# Patient Record
Sex: MALE | Race: BLACK OR AFRICAN AMERICAN | NOT HISPANIC OR LATINO | Employment: FULL TIME | URBAN - METROPOLITAN AREA
[De-identification: names, ages, dates, MRNs, and addresses within clinical notes are randomized per-mention and may not be internally consistent; named-entity substitution may affect disease eponyms.]

---
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
## 2022-04-26 ENCOUNTER — HOSPITAL ENCOUNTER (EMERGENCY)
Facility: CLINIC | Age: 22
Discharge: STILL ADMITTED OR ADMINISTRATIVE | End: 2022-04-26
Payer: MEDICAID

## 2022-04-27 ENCOUNTER — OFFICE VISIT (OUTPATIENT)
Dept: BEHAVIORAL HEALTH | Facility: CLINIC | Age: 22
End: 2022-04-27
Payer: COMMERCIAL

## 2022-04-27 ENCOUNTER — LAB (OUTPATIENT)
Dept: LAB | Facility: CLINIC | Age: 22
End: 2022-04-27
Payer: COMMERCIAL

## 2022-04-27 VITALS
SYSTOLIC BLOOD PRESSURE: 132 MMHG | HEART RATE: 98 BPM | BODY MASS INDEX: 25.61 KG/M2 | HEIGHT: 68 IN | WEIGHT: 169 LBS | DIASTOLIC BLOOD PRESSURE: 81 MMHG

## 2022-04-27 DIAGNOSIS — F11.20 OPIOID USE DISORDER, SEVERE, DEPENDENCE (H): ICD-10-CM

## 2022-04-27 DIAGNOSIS — Z11.3 SCREEN FOR STD (SEXUALLY TRANSMITTED DISEASE): ICD-10-CM

## 2022-04-27 DIAGNOSIS — F11.20 OPIOID USE DISORDER, SEVERE, DEPENDENCE (H): Primary | ICD-10-CM

## 2022-04-27 DIAGNOSIS — F11.23 OPIOID DEPENDENCE WITH WITHDRAWAL (H): ICD-10-CM

## 2022-04-27 LAB
ALBUMIN SERPL-MCNC: 3.7 G/DL (ref 3.4–5)
ALP SERPL-CCNC: 89 U/L (ref 40–150)
ALT SERPL W P-5'-P-CCNC: 55 U/L (ref 0–70)
AST SERPL W P-5'-P-CCNC: 24 U/L (ref 0–45)
BILIRUB DIRECT SERPL-MCNC: 0.2 MG/DL (ref 0–0.2)
BILIRUB SERPL-MCNC: 1.2 MG/DL (ref 0.2–1.3)
FENTANYL UR QL: ABNORMAL
PROT SERPL-MCNC: 7.8 G/DL (ref 6.8–8.8)

## 2022-04-27 PROCEDURE — 87591 N.GONORRHOEAE DNA AMP PROB: CPT | Performed by: NURSE PRACTITIONER

## 2022-04-27 PROCEDURE — 86803 HEPATITIS C AB TEST: CPT

## 2022-04-27 PROCEDURE — 80076 HEPATIC FUNCTION PANEL: CPT

## 2022-04-27 PROCEDURE — 36415 COLL VENOUS BLD VENIPUNCTURE: CPT

## 2022-04-27 PROCEDURE — 86780 TREPONEMA PALLIDUM: CPT

## 2022-04-27 PROCEDURE — 99205 OFFICE O/P NEW HI 60 MIN: CPT | Performed by: NURSE PRACTITIONER

## 2022-04-27 PROCEDURE — 80307 DRUG TEST PRSMV CHEM ANLYZR: CPT | Performed by: NURSE PRACTITIONER

## 2022-04-27 PROCEDURE — 87389 HIV-1 AG W/HIV-1&-2 AB AG IA: CPT

## 2022-04-27 PROCEDURE — 87491 CHLMYD TRACH DNA AMP PROBE: CPT | Performed by: NURSE PRACTITIONER

## 2022-04-27 RX ORDER — ONDANSETRON 4 MG/1
4 TABLET, ORALLY DISINTEGRATING ORAL EVERY 8 HOURS PRN
Qty: 12 TABLET | Refills: 0 | Status: SHIPPED | OUTPATIENT
Start: 2022-04-27

## 2022-04-27 RX ORDER — HYDROXYZINE PAMOATE 25 MG/1
CAPSULE ORAL
COMMUNITY
Start: 2022-04-20

## 2022-04-27 RX ORDER — GABAPENTIN 300 MG/1
300 CAPSULE ORAL 3 TIMES DAILY PRN
Qty: 21 CAPSULE | Refills: 0 | Status: SHIPPED | OUTPATIENT
Start: 2022-04-27 | End: 2022-05-04

## 2022-04-27 RX ORDER — BUPRENORPHINE HYDROCHLORIDE, NALOXONE HYDROCHLORIDE 8; 2 MG/1; MG/1
1 FILM, SOLUBLE BUCCAL; SUBLINGUAL 2 TIMES DAILY
Qty: 14 FILM | Refills: 0 | Status: SHIPPED | OUTPATIENT
Start: 2022-04-27 | End: 2022-05-04

## 2022-04-27 RX ORDER — CLONIDINE HYDROCHLORIDE 0.1 MG/1
0.1 TABLET ORAL 3 TIMES DAILY PRN
Qty: 21 TABLET | Refills: 0 | Status: SHIPPED | OUTPATIENT
Start: 2022-04-27 | End: 2022-05-04

## 2022-04-27 RX ORDER — QUETIAPINE FUMARATE 50 MG/1
TABLET, FILM COATED ORAL
COMMUNITY
Start: 2022-04-20 | End: 2022-04-27

## 2022-04-27 RX ORDER — TRAZODONE HYDROCHLORIDE 50 MG/1
50 TABLET, FILM COATED ORAL AT BEDTIME
Qty: 30 TABLET | Refills: 0 | Status: SHIPPED | OUTPATIENT
Start: 2022-04-27

## 2022-04-27 NOTE — PROGRESS NOTES
[facility] Initial Visit    ASSESSMENT/PLAN                                                      1. Opioid use disorder, severe, dependence (H)  - Discussed mechanism of action, potential risks/benefits/side effects of Suboxone. Discussed risk of precipitated withdrawal with initiation of buprenorphine in the presence of full opioid agonists. Has been greater than 24 hours since last fentanyl use, recommended starting with 1/2 film and titration of dose up to 16 mg per day.   - Prescription sent for narcan with refills for harm reduction   - Encouraged treatment at [facility] and ongoing recovery supports.   - SUBOXONE 8-2 MG per film; Place 1 Film under the tongue 2 times daily for 7 days  Dispense: 14 Film; Refill: 0  - Hepatic panel (Albumin, ALT, AST, Bili, Alk Phos, TP); Future    2. Opioid dependence with withdrawal (H)  - gabapentin (NEURONTIN) 300 MG capsule; Take 1 capsule (300 mg) by mouth 3 times daily as needed for other (withdrawal symptoms including anxiety, restlessness, sleeplessness)  Dispense: 21 capsule; Refill: 0  - cloNIDine (CATAPRES) 0.1 MG tablet; Take 1 tablet (0.1 mg) by mouth 3 times daily as needed (opioid withdrawal, anxiety, restlessness, irritability, hot/cold flashes)  Dispense: 21 tablet; Refill: 0  - traZODone (DESYREL) 50 MG tablet; Take 1 tablet (50 mg) by mouth At Bedtime  Dispense: 30 tablet; Refill: 0  - ondansetron (ZOFRAN-ODT) 4 MG ODT tab; Take 1 tablet (4 mg) by mouth every 8 hours as needed for nausea  Dispense: 12 tablet; Refill: 0    3. Screen for STD (sexually transmitted disease)  - Pt requesting STD screening today. Not symptomatic. HSV 1 and/or 2 IgG 22.3 on 11/19/21  - HIV Antigen Antibody Combo; Future  - Hepatitis C Screen Reflex to HCV RNA Quant and Genotype; Future  - Treponema Abs w Reflex to RPR and Titer; Future  - NEISSERIA GONORRHOEA PCR  - CHLAMYDIA TRACHOMATIS PCR      Patient counseling completed today:  Recommend pt keep naloxone in  "their possession and reviewed other aspects of harm reduction to reduce risk of overdose with return to use. Recommended avoiding concurrent use of alcohol, benzodiazepines or other sedatives with buprenorphine or other opioids.  Discussed importance of avoiding isolation, building a network of supportive relationships, avoiding people/places/things associated with past use to reduce risk of relapse; including motivational interviewing regarding psychosocial treatment for addiction.      Return in about 1 week (around 5/4/2022) for Follow up, with me, in person.    SUBJECTIVE                                                      CC/HPI:  Madi Zapata is a 21 year old male with PMH of opioid use disorder who presents to the [facility] for initial visit.  Pt was referred by PRS at [facility].  Pt decided to seek treatment in Recovery Clinic to establish with suboxone provider.     Reports he was in treatment at [facility]. For 3 and 1/2 weeks. He was kicked out for substance use and \"not reporting a friend who was using at the facility\" He is now in treatment at [facility]. He was brought here by a PRS there. He was taking Suboxone 8 mg BID. He has an active prescription but reports he is not sure where the rest of his medications are. He has been using fentanyl pills for the past 6 days. No history of over dose. Typically uses 10 -15 pills per day. However with recent relapse he has been using about 5 pills. He also used meth on Saturday. He does not typically use this, maybe once in the past 6 month. Used it to combat adverse side effects of opioids with withdrawal.  Reports his last use of fentanyl was over 24 hours ago. This is his third time in treatment. Longest sober period was 3 months. He is on probation and court ordered to be in treatment, he is not sure if being at [facility] now will fulfill that obligation since he was discharged from [facility]. Would like STD screening today. Sexually active. Not symptomatic. " "Only slept for 2 hours last night, very drowsy today. When asked directly pt denies any substance use in the past 24 hours. Denies benzodiazepine or alcohol use.       Substance Use History :  Opioids:   Age at first use: 21 yo, tried from a friend. \"only escalated from there\"   Current use: timing of last use: 4/26/22 1400; substance: \"perc 30\"; quantity 10-15 pills daily; route: oral      IV drug use: No   History of overdose: No  Previous treatments : Yes  Longest period of sobriety: 3 months  Medical complications related to substance use: denies  Hepatitis C: nonreactive 11/19/21  HIV:nonreactive 11/19/21    Taking buprenorphine? No, ran out last week  Narcan currently available: No    DSM-5 OUD criteria met:  Taken in larger amounts/greater time spent in behavior over longer period of time than intended,Yes   Persistent desire or unsuccessful efforts to cut down or control use/behavior, Yes   A great deal of time is spent in activities necessary to obtain the substance/participate in the behavior or recover from its effects, Yes   Cravings, Yes   Recurrent use/behavior resulting in failure to fulfill major role obligations at work, school, or home, Yes   Continued use/behavior despite having persistent or recurrent social or interpersonal problems caused or exacerbated by effects of use/behavior, Yes   Important social, occupational, or recreational activities are given up or reduced because of use/behavior, Yes   Recurrent use/behavior in situations in which it is physically hazardous, No   Continued use/behavior despite knowledge of having a persistent or recurrent physical or psychological problem that is likely to have been caused or exacerbated by use/behavior, Yes   Tolerance, Yes    Withdrawal, Yes     Other Addiction History:  Stimulants:   Past use: denies  Use in last 6 months: Meth one time  Sedatives/hypnotics/anxiolytics:   Past use: denies  Use in last 6 months: denies  Alcohol:   Past use: " denies  Use in last 6 months: denies  Nicotine:   Cigarettes: 2-3 daily  Vaping: denies  Chewing tobacco: denies  Cannabis:   Past use: started at age 15  Use in last 6 months: daily use  Hallucinogens:   Past use: denies  Use in last 6 months: denies  Behavioral addictions:   denies    [place] Prescription Drug Monitoring Program Reviewed:  Yes    Past Medical History:   Diagnosis Date     Anxiety      PAST PSYCHIATRIC HISTORY:  Diagnoses- PTSD, depression   Suicide Attempts: No   Hospitalizations: No     PHQ-2 Score:     PHQ-2 ( 1999 [employer]) 4/27/2022   Q1: Little interest or pleasure in doing things 3   Q2: Feeling down, depressed or hopeless 3   PHQ-2 Score 6        If PHQ-2 score of 3 or higher, has [facility] therapist or provider been notified? Yes    Any current suicidal ideation? No  If yes, has [facility] therapist or provider been notified? N/A    Mental health provider: caroline (follow up on  referral if needed)    No past surgical history on file.    Medications:  hydrOXYzine (VISTARIL) 25 MG capsule,   ondansetron (ZOFRAN-ODT) 4 MG ODT tab, Take 1 tablet (4 mg) by mouth every 8 hours as needed for nausea  QUEtiapine (SEROQUEL) 50 MG tablet,     No current facility-administered medications on file prior to visit.      No Known Allergies    No family history on file.      Social History  Housing status: in a sober house  Employment status: Unemployed, not seeking work  Relationship status: Partnered  Children: 2 children  Legal: on probation   Insurance needs: active  Contact information up to date? yes    3rd Party Involvement none today (please obtain BENITO if pt would like to include)    REVIEW OF SYSTEMS:  General: Withdrawal symptoms as described below.  No recent fevers.   Eyes:  No vision concerns.  No jaundice.    Resp: No coughing, wheezing or shortness of breath  CV: No chest pains or palpitations  GI: No complaints other than as above  : No urinary frequency or dysuria, no  "discharge  Musculoskeletal: No significant muscle or joint pains other than as above.  No edema  Neurologic: No numbness, tingling, weakness, problems with balance or coordination  Psychiatric: No acute concerns other than as above.   Skin: No rashes or areas of acute infection    OBJECTIVE                                                      Clinical Opioid Withdrawal Scale (COWS)  Resting Pulse Rate  1  =     Sweating    (over past 1/2 hour) 1  =  subjective report of chills or flushing   Restlessness  0  =  able to sit still   Pupil size  0  =  pupils pinned or normal size for room light   Bone or Joint Aches    (acute only) 1  =  mild diffuse discomfort   Runny nose or tearing    (unrelated to cold/allergies) 0  =  not present   GI Upset    (over past 1/2 hour) 2  =  nausea or loose stool   Tremor    (outstretched hands) 0  =  no tremor   Yawning    (during assessment) 0  =  no yawning   Anxiety/Irritability 0  =  none   Gooseflesh skin 0  =  skin is smooth     TOTAL SCORE  Add column for score   4       /81   Pulse 98   Ht 1.727 m (5' 8\")   Wt 76.7 kg (169 lb)   BMI 25.70 kg/m      Physical Exam  Constitutional:       General: He is awake. He is not in acute distress.     Appearance: Normal appearance. He is not ill-appearing or diaphoretic.   HENT:      Head: Normocephalic and atraumatic.      Nose: No rhinorrhea.   Eyes:      General: No scleral icterus.     Extraocular Movements: Extraocular movements intact.   Cardiovascular:      Rate and Rhythm: Normal rate.   Pulmonary:      Effort: Pulmonary effort is normal.   Neurological:      General: No focal deficit present.      Mental Status: He is oriented to person, place, and time and easily aroused. He is lethargic.      Motor: No tremor.      Coordination: Coordination normal.      Gait: Gait normal.   Psychiatric:         Attention and Perception: Perception normal. He is inattentive.         Mood and Affect: Mood normal. Mood is not " anxious or depressed. Affect is not flat.         Speech: Speech normal. Speech is not rapid and pressured, delayed or slurred.         Behavior: Behavior is not agitated, withdrawn or hyperactive. Behavior is cooperative.         Thought Content: Thought content normal. Thought content does not include suicidal ideation.         Cognition and Memory: Cognition and memory normal.      Comments: Insight and judgment if fair          Labs:    UDS: positive for methamphetamines  *POC urine drug screen does not screen for Fentanyl    Recent Results (from the past 720 hour(s))   Hepatic panel (Albumin, ALT, AST, Bili, Alk Phos, TP)    Collection Time: 04/27/22  2:22 PM   Result Value Ref Range    Bilirubin Total 1.2 0.2 - 1.3 mg/dL    Bilirubin Direct 0.2 0.0 - 0.2 mg/dL    Protein Total 7.8 6.8 - 8.8 g/dL    Albumin 3.7 3.4 - 5.0 g/dL    Alkaline Phosphatase 89 40 - 150 U/L    AST 24 0 - 45 U/L    ALT 55 0 - 70 U/L       Total time 60 min spent in review of medical record,  review, obtaining histories, reviewing symptoms, discussing pt's goals for treatment, counseling noted above.    NEGRITO Hernandez CNP on 4/27/2022 at 3:41 PM  [place] [id number] [place] [id number] [phone]

## 2022-04-28 LAB
C TRACH DNA SPEC QL NAA+PROBE: NEGATIVE
HCV AB SERPL QL IA: NONREACTIVE
HIV 1+2 AB+HIV1 P24 AG SERPL QL IA: NONREACTIVE
N GONORRHOEA DNA SPEC QL NAA+PROBE: NEGATIVE
T PALLIDUM AB SER QL: NONREACTIVE

## 2022-05-04 ENCOUNTER — TELEPHONE (OUTPATIENT)
Dept: BEHAVIORAL HEALTH | Facility: CLINIC | Age: 22
End: 2022-05-04
Payer: COMMERCIAL

## 2022-08-06 ENCOUNTER — HOSPITAL ENCOUNTER (EMERGENCY)
Facility: CLINIC | Age: 22
Discharge: HOME | End: 2022-08-06
Payer: COMMERCIAL

## 2022-08-06 VITALS
DIASTOLIC BLOOD PRESSURE: 53 MMHG | SYSTOLIC BLOOD PRESSURE: 126 MMHG | RESPIRATION RATE: 16 BRPM | TEMPERATURE: 98.7 F | BODY MASS INDEX: 25.94 KG/M2 | WEIGHT: 171.2 LBS | HEART RATE: 100 BPM | OXYGEN SATURATION: 97 % | HEIGHT: 68 IN

## 2024-02-22 ENCOUNTER — HOSPITAL ENCOUNTER (EMERGENCY)
Facility: CLINIC | Age: 24
Discharge: HOME | End: 2024-02-23
Attending: EMERGENCY MEDICINE | Admitting: EMERGENCY MEDICINE
Payer: COMMERCIAL

## 2024-02-22 ENCOUNTER — APPOINTMENT (OUTPATIENT)
Dept: CT IMAGING | Facility: CLINIC | Age: 24
End: 2024-02-22
Attending: EMERGENCY MEDICINE
Payer: COMMERCIAL

## 2024-02-22 ENCOUNTER — APPOINTMENT (OUTPATIENT)
Dept: GENERAL RADIOLOGY | Facility: CLINIC | Age: 24
End: 2024-02-22
Attending: EMERGENCY MEDICINE
Payer: COMMERCIAL

## 2024-02-22 DIAGNOSIS — R04.2 HEMOPTYSIS: ICD-10-CM

## 2024-02-22 DIAGNOSIS — J06.9 VIRAL URI: ICD-10-CM

## 2024-02-22 DIAGNOSIS — J20.9 ACUTE BRONCHITIS, UNSPECIFIED ORGANISM: ICD-10-CM

## 2024-02-22 LAB
ANION GAP SERPL CALCULATED.3IONS-SCNC: 11 MMOL/L (ref 7–15)
BASOPHILS # BLD AUTO: 0.1 10E3/UL (ref 0–0.2)
BASOPHILS NFR BLD AUTO: 1 %
BUN SERPL-MCNC: 23.9 MG/DL (ref 6–20)
CALCIUM SERPL-MCNC: 9.2 MG/DL (ref 8.6–10)
CHLORIDE SERPL-SCNC: 102 MMOL/L (ref 98–107)
CREAT SERPL-MCNC: 0.87 MG/DL (ref 0.67–1.17)
D DIMER PPP FEU-MCNC: 0.58 UG/ML FEU (ref 0–0.5)
DEPRECATED HCO3 PLAS-SCNC: 24 MMOL/L (ref 22–29)
EGFRCR SERPLBLD CKD-EPI 2021: >90 ML/MIN/1.73M2
EOSINOPHIL # BLD AUTO: 0.7 10E3/UL (ref 0–0.7)
EOSINOPHIL NFR BLD AUTO: 6 %
ERYTHROCYTE [DISTWIDTH] IN BLOOD BY AUTOMATED COUNT: 11.6 % (ref 10–15)
FLUAV RNA SPEC QL NAA+PROBE: NEGATIVE
FLUBV RNA RESP QL NAA+PROBE: NEGATIVE
GLUCOSE SERPL-MCNC: 109 MG/DL (ref 70–99)
HCT VFR BLD AUTO: 46.6 % (ref 40–53)
HGB BLD-MCNC: 16.2 G/DL (ref 13.3–17.7)
IMM GRANULOCYTES # BLD: 0.4 10E3/UL
IMM GRANULOCYTES NFR BLD: 3 %
INR PPP: 1 (ref 0.85–1.15)
LYMPHOCYTES # BLD AUTO: 3.1 10E3/UL (ref 0.8–5.3)
LYMPHOCYTES NFR BLD AUTO: 24 %
MCH RBC QN AUTO: 29.6 PG (ref 26.5–33)
MCHC RBC AUTO-ENTMCNC: 34.8 G/DL (ref 31.5–36.5)
MCV RBC AUTO: 85 FL (ref 78–100)
MONOCYTES # BLD AUTO: 0.8 10E3/UL (ref 0–1.3)
MONOCYTES NFR BLD AUTO: 6 %
NEUTROPHILS # BLD AUTO: 7.8 10E3/UL (ref 1.6–8.3)
NEUTROPHILS NFR BLD AUTO: 60 %
NRBC # BLD AUTO: 0 10E3/UL
NRBC BLD AUTO-RTO: 0 /100
PLATELET # BLD AUTO: 289 10E3/UL (ref 150–450)
POTASSIUM SERPL-SCNC: 4 MMOL/L (ref 3.4–5.3)
RBC # BLD AUTO: 5.47 10E6/UL (ref 4.4–5.9)
RSV RNA SPEC NAA+PROBE: NEGATIVE
SARS-COV-2 RNA RESP QL NAA+PROBE: NEGATIVE
SODIUM SERPL-SCNC: 137 MMOL/L (ref 135–145)
WBC # BLD AUTO: 12.9 10E3/UL (ref 4–11)

## 2024-02-22 PROCEDURE — 96360 HYDRATION IV INFUSION INIT: CPT | Mod: 59 | Performed by: EMERGENCY MEDICINE

## 2024-02-22 PROCEDURE — 71275 CT ANGIOGRAPHY CHEST: CPT

## 2024-02-22 PROCEDURE — 36415 COLL VENOUS BLD VENIPUNCTURE: CPT | Performed by: EMERGENCY MEDICINE

## 2024-02-22 PROCEDURE — 71046 X-RAY EXAM CHEST 2 VIEWS: CPT

## 2024-02-22 PROCEDURE — 258N000003 HC RX IP 258 OP 636: Performed by: EMERGENCY MEDICINE

## 2024-02-22 PROCEDURE — 87637 SARSCOV2&INF A&B&RSV AMP PRB: CPT | Performed by: EMERGENCY MEDICINE

## 2024-02-22 PROCEDURE — 85379 FIBRIN DEGRADATION QUANT: CPT | Performed by: EMERGENCY MEDICINE

## 2024-02-22 PROCEDURE — 250N000009 HC RX 250: Performed by: EMERGENCY MEDICINE

## 2024-02-22 PROCEDURE — 99285 EMERGENCY DEPT VISIT HI MDM: CPT | Mod: 25 | Performed by: EMERGENCY MEDICINE

## 2024-02-22 PROCEDURE — 99284 EMERGENCY DEPT VISIT MOD MDM: CPT | Performed by: EMERGENCY MEDICINE

## 2024-02-22 PROCEDURE — 80048 BASIC METABOLIC PNL TOTAL CA: CPT | Performed by: EMERGENCY MEDICINE

## 2024-02-22 PROCEDURE — 85025 COMPLETE CBC W/AUTO DIFF WBC: CPT | Performed by: EMERGENCY MEDICINE

## 2024-02-22 PROCEDURE — 85610 PROTHROMBIN TIME: CPT | Performed by: EMERGENCY MEDICINE

## 2024-02-22 PROCEDURE — 250N000011 HC RX IP 250 OP 636: Performed by: EMERGENCY MEDICINE

## 2024-02-22 RX ORDER — IOPAMIDOL 755 MG/ML
100 INJECTION, SOLUTION INTRAVASCULAR ONCE
Status: COMPLETED | OUTPATIENT
Start: 2024-02-22 | End: 2024-02-22

## 2024-02-22 RX ADMIN — IOPAMIDOL 64 ML: 755 INJECTION, SOLUTION INTRAVENOUS at 23:55

## 2024-02-22 RX ADMIN — SODIUM CHLORIDE 1000 ML: 9 INJECTION, SOLUTION INTRAVENOUS at 23:03

## 2024-02-22 RX ADMIN — SODIUM CHLORIDE 84 ML: 9 INJECTION, SOLUTION INTRAVENOUS at 23:55

## 2024-02-22 ASSESSMENT — ACTIVITIES OF DAILY LIVING (ADL)
ADLS_ACUITY_SCORE: 35
ADLS_ACUITY_SCORE: 35

## 2024-02-22 ASSESSMENT — COLUMBIA-SUICIDE SEVERITY RATING SCALE - C-SSRS
6. HAVE YOU EVER DONE ANYTHING, STARTED TO DO ANYTHING, OR PREPARED TO DO ANYTHING TO END YOUR LIFE?: NO
2. HAVE YOU ACTUALLY HAD ANY THOUGHTS OF KILLING YOURSELF IN THE PAST MONTH?: NO
1. IN THE PAST MONTH, HAVE YOU WISHED YOU WERE DEAD OR WISHED YOU COULD GO TO SLEEP AND NOT WAKE UP?: NO

## 2024-02-23 VITALS
RESPIRATION RATE: 18 BRPM | TEMPERATURE: 98 F | SYSTOLIC BLOOD PRESSURE: 130 MMHG | DIASTOLIC BLOOD PRESSURE: 67 MMHG | HEIGHT: 68 IN | OXYGEN SATURATION: 97 % | HEART RATE: 75 BPM | BODY MASS INDEX: 30.31 KG/M2 | WEIGHT: 200 LBS

## 2024-02-23 RX ORDER — BENZONATATE 200 MG/1
200 CAPSULE ORAL 3 TIMES DAILY PRN
Qty: 10 CAPSULE | Refills: 0 | Status: SHIPPED | OUTPATIENT
Start: 2024-02-23

## 2024-02-23 NOTE — ED TRIAGE NOTES
Triage Assessment (Adult)       Row Name 02/22/24 7021          Triage Assessment    Airway WDL WDL        Respiratory WDL    Respiratory WDL WDL        Skin Circulation/Temperature WDL    Skin Circulation/Temperature WDL WDL        Cardiac WDL    Cardiac WDL WDL        Peripheral/Neurovascular WDL    Peripheral Neurovascular WDL WDL

## 2024-02-23 NOTE — ED PROVIDER NOTES
"ED Provider Note  [facility]      History     Chief Complaint   Patient presents with    Hemoptysis     HPI  Madi Zapata is a 23 year old male who presents to the emergency room for evaluation of hemoptysis.  Patient states he has been ill with a cough has been productive for the past 4 to 5 days.  The cough was originally productive of green sputum.  Today, the patient has had blood-streaked sputum.  He complains of some chest pain and headache when he coughs but otherwise denies any constant or ongoing pain.  He denies any dyspnea.  No fever.  No recent ill exposures.  No recent travel.  No leg pain or swelling.  Patient denies any other bruising or bleeding.  No epistaxis, hematuria, or gingival bleeding.  He denies any anticoagulant use.  No medication use.    Past Medical History  Past Medical History:   Diagnosis Date    Anxiety      History reviewed. No pertinent surgical history.  benzonatate (TESSALON) 200 MG capsule  cloNIDine (CATAPRES) 0.1 MG tablet  gabapentin (NEURONTIN) 300 MG capsule  hydrOXYzine (VISTARIL) 25 MG capsule  naloxone (NARCAN) 4 MG/0.1ML nasal spray  ondansetron (ZOFRAN-ODT) 4 MG ODT tab  traZODone (DESYREL) 50 MG tablet      No Known Allergies  Family History  No family history on file.  Social History   Social History     Tobacco Use    Smoking status: Every Day     Types: Vaping Device    Smokeless tobacco: Never   Substance Use Topics    Alcohol use: Not Currently    Drug use: Not Currently     Types: Opiates         A medically appropriate review of systems was performed with pertinent positives and negatives noted in the HPI, and all other systems negative.    Physical Exam   BP: 104/65  Pulse: 98  Temp: 98.4  F (36.9  C)  Resp: 18  Height: 172.7 cm (5' 8\")  Weight: 90.7 kg (200 lb)  SpO2: 96 %  Physical Exam  Vitals and nursing note reviewed.   Constitutional:       General: He is not in acute distress.     Appearance: Normal appearance. He is " not diaphoretic.   HENT:      Head: Normocephalic and atraumatic.   Eyes:      General: No scleral icterus.     Pupils: Pupils are equal, round, and reactive to light.   Cardiovascular:      Rate and Rhythm: Normal rate and regular rhythm.      Pulses: Normal pulses.      Heart sounds: Normal heart sounds.   Pulmonary:      Effort: No respiratory distress.      Breath sounds: Normal breath sounds.      Comments: Bibasilar fine crackles.  Abdominal:      General: Bowel sounds are normal.      Palpations: Abdomen is soft.      Tenderness: There is no abdominal tenderness.   Musculoskeletal:         General: No tenderness. Normal range of motion.   Skin:     General: Skin is warm and dry.      Findings: No rash.   Neurological:      General: No focal deficit present.      Mental Status: He is alert.      Motor: No weakness.      Coordination: Coordination normal.      Gait: Gait normal.   Psychiatric:         Mood and Affect: Mood normal.           ED Course, Procedures, & Data      Procedures             Results for orders placed or performed during the hospital encounter of 02/22/24   XR Chest 2 Views     Status: None    Narrative    EXAM: XR CHEST 2 VIEWS  LOCATION: [facility]  DATE: 2/22/2024    INDICATION: Cough.  COMPARISON: None available.      Impression    IMPRESSION: No focal airspace consolidation. No pleural effusion or pneumothorax.    Cardiomediastinal silhouette is normal.   CT Chest Pulmonary Embolism w Contrast     Status: None    Narrative    EXAM: CT CHEST PULMONARY EMBOLISM W CONTRAST  LOCATION: [facility]  DATE: 2/23/2024    INDICATION: hemoptysis, cough, SOA, (+) D dimer  eval for PE  COMPARISON: None.  TECHNIQUE: CT chest pulmonary angiogram during arterial phase injection of IV contrast. Multiplanar reformats and MIP reconstructions were performed. Dose reduction techniques were used.   CONTRAST: 64 mL  Isovue 370    FINDINGS:  ANGIOGRAM CHEST: Pulmonary arteries are normal caliber and negative for pulmonary emboli. Thoracic aorta is negative for dissection. No CT evidence of right heart strain.    LUNGS AND PLEURA: Normal.    MEDIASTINUM/AXILLAE: Normal.    CORONARY ARTERY CALCIFICATION: None.    UPPER ABDOMEN: Normal.    MUSCULOSKELETAL: Normal.      Impression    IMPRESSION:  1.  Negative CT pulmonary angiogram.   Basic metabolic panel     Status: Abnormal   Result Value Ref Range    Sodium 137 135 - 145 mmol/L    Potassium 4.0 3.4 - 5.3 mmol/L    Chloride 102 98 - 107 mmol/L    Carbon Dioxide (CO2) 24 22 - 29 mmol/L    Anion Gap 11 7 - 15 mmol/L    Urea Nitrogen 23.9 (H) 6.0 - 20.0 mg/dL    Creatinine 0.87 0.67 - 1.17 mg/dL    GFR Estimate >90 >60 mL/min/1.73m2    Calcium 9.2 8.6 - 10.0 mg/dL    Glucose 109 (H) 70 - 99 mg/dL   D dimer quantitative     Status: Abnormal   Result Value Ref Range    D-Dimer Quantitative 0.58 (H) 0.00 - 0.50 ug/mL FEU    Narrative    This D-dimer assay is intended for use in conjunction with a clinical pretest probability assessment model to exclude pulmonary embolism (PE) and deep venous thrombosis (DVT) in outpatients suspected of PE or DVT. The cut-off value is 0.50 ug/mL FEU.   INR     Status: Normal   Result Value Ref Range    INR 1.00 0.85 - 1.15   Symptomatic Influenza A/B, RSV, & SARS-CoV2 PCR (COVID-19) Nasopharyngeal     Status: Normal    Specimen: Nasopharyngeal; Swab   Result Value Ref Range    Influenza A PCR Negative Negative    Influenza B PCR Negative Negative    RSV PCR Negative Negative    SARS CoV2 PCR Negative Negative    Narrative    Testing was performed using the Xpert Xpress CoV2/Flu/RSV Assay on the Cepheid GeneXpert Instrument. This test should be ordered for the detection of SARS-CoV-2, influenza, and RSV viruses in individuals who meet clinical and/or epidemiological criteria. Test performance is unknown in asymptomatic patients. This test is for in vitro  diagnostic use under the FDA EUA for laboratories certified under CLIA to perform high or moderate complexity testing. This test has not been FDA cleared or approved. A negative result does not rule out the presence of PCR inhibitors in the specimen or target RNA in concentration below the limit of detection for the assay. If only one viral target is positive but coinfection with multiple targets is suspected, the sample should be re-tested with another FDA cleared, approved, or authorized test, if coinfection would change clinical management. This test was validated by the [facility]. These laboratories are certified under the Clinical Laboratory Improvement Amendments of 1988 (CLIA-88) as qualified to perform high complexity laboratory testing.   CBC with platelets and differential     Status: Abnormal   Result Value Ref Range    WBC Count 12.9 (H) 4.0 - 11.0 10e3/uL    RBC Count 5.47 4.40 - 5.90 10e6/uL    Hemoglobin 16.2 13.3 - 17.7 g/dL    Hematocrit 46.6 40.0 - 53.0 %    MCV 85 78 - 100 fL    MCH 29.6 26.5 - 33.0 pg    MCHC 34.8 31.5 - 36.5 g/dL    RDW 11.6 10.0 - 15.0 %    Platelet Count 289 150 - 450 10e3/uL    % Neutrophils 60 %    % Lymphocytes 24 %    % Monocytes 6 %    % Eosinophils 6 %    % Basophils 1 %    % Immature Granulocytes 3 %    NRBCs per 100 WBC 0 <1 /100    Absolute Neutrophils 7.8 1.6 - 8.3 10e3/uL    Absolute Lymphocytes 3.1 0.8 - 5.3 10e3/uL    Absolute Monocytes 0.8 0.0 - 1.3 10e3/uL    Absolute Eosinophils 0.7 0.0 - 0.7 10e3/uL    Absolute Basophils 0.1 0.0 - 0.2 10e3/uL    Absolute Immature Granulocytes 0.4 <=0.4 10e3/uL    Absolute NRBCs 0.0 10e3/uL   CBC with platelets differential     Status: Abnormal    Narrative    The following orders were created for panel order CBC with platelets differential.  Procedure                               Abnormality         Status                     ---------                               -----------         ------                      CBC with platelets and d...[[id number]]  Abnormal            Final result                 Please view results for these tests on the individual orders.             Results for orders placed or performed during the hospital encounter of 02/22/24   XR Chest 2 Views     Status: None    Narrative    EXAM: XR CHEST 2 VIEWS  LOCATION: [facility]  DATE: 2/22/2024    INDICATION: Cough.  COMPARISON: None available.      Impression    IMPRESSION: No focal airspace consolidation. No pleural effusion or pneumothorax.    Cardiomediastinal silhouette is normal.   CT Chest Pulmonary Embolism w Contrast     Status: None    Narrative    EXAM: CT CHEST PULMONARY EMBOLISM W CONTRAST  LOCATION: [facility]  DATE: 2/23/2024    INDICATION: hemoptysis, cough, SOA, (+) D dimer  eval for PE  COMPARISON: None.  TECHNIQUE: CT chest pulmonary angiogram during arterial phase injection of IV contrast. Multiplanar reformats and MIP reconstructions were performed. Dose reduction techniques were used.   CONTRAST: 64 mL Isovue 370    FINDINGS:  ANGIOGRAM CHEST: Pulmonary arteries are normal caliber and negative for pulmonary emboli. Thoracic aorta is negative for dissection. No CT evidence of right heart strain.    LUNGS AND PLEURA: Normal.    MEDIASTINUM/AXILLAE: Normal.    CORONARY ARTERY CALCIFICATION: None.    UPPER ABDOMEN: Normal.    MUSCULOSKELETAL: Normal.      Impression    IMPRESSION:  1.  Negative CT pulmonary angiogram.   Basic metabolic panel     Status: Abnormal   Result Value Ref Range    Sodium 137 135 - 145 mmol/L    Potassium 4.0 3.4 - 5.3 mmol/L    Chloride 102 98 - 107 mmol/L    Carbon Dioxide (CO2) 24 22 - 29 mmol/L    Anion Gap 11 7 - 15 mmol/L    Urea Nitrogen 23.9 (H) 6.0 - 20.0 mg/dL    Creatinine 0.87 0.67 - 1.17 mg/dL    GFR Estimate >90 >60 mL/min/1.73m2    Calcium 9.2 8.6 - 10.0 mg/dL    Glucose 109 (H) 70 - 99 mg/dL   D dimer  quantitative     Status: Abnormal   Result Value Ref Range    D-Dimer Quantitative 0.58 (H) 0.00 - 0.50 ug/mL FEU    Narrative    This D-dimer assay is intended for use in conjunction with a clinical pretest probability assessment model to exclude pulmonary embolism (PE) and deep venous thrombosis (DVT) in outpatients suspected of PE or DVT. The cut-off value is 0.50 ug/mL FEU.   INR     Status: Normal   Result Value Ref Range    INR 1.00 0.85 - 1.15   Symptomatic Influenza A/B, RSV, & SARS-CoV2 PCR (COVID-19) Nasopharyngeal     Status: Normal    Specimen: Nasopharyngeal; Swab   Result Value Ref Range    Influenza A PCR Negative Negative    Influenza B PCR Negative Negative    RSV PCR Negative Negative    SARS CoV2 PCR Negative Negative    Narrative    Testing was performed using the Xpert Xpress CoV2/Flu/RSV Assay on the Cepheid GeneXpert Instrument. This test should be ordered for the detection of SARS-CoV-2, influenza, and RSV viruses in individuals who meet clinical and/or epidemiological criteria. Test performance is unknown in asymptomatic patients. This test is for in vitro diagnostic use under the FDA EUA for laboratories certified under CLIA to perform high or moderate complexity testing. This test has not been FDA cleared or approved. A negative result does not rule out the presence of PCR inhibitors in the specimen or target RNA in concentration below the limit of detection for the assay. If only one viral target is positive but coinfection with multiple targets is suspected, the sample should be re-tested with another FDA cleared, approved, or authorized test, if coinfection would change clinical management. This test was validated by the [facility]. These laboratories are certified under the Clinical Laboratory Improvement Amendments of 1988 (CLIA-88) as qualified to perform high complexity laboratory testing.   CBC with platelets and differential     Status: Abnormal   Result Value  Ref Range    WBC Count 12.9 (H) 4.0 - 11.0 10e3/uL    RBC Count 5.47 4.40 - 5.90 10e6/uL    Hemoglobin 16.2 13.3 - 17.7 g/dL    Hematocrit 46.6 40.0 - 53.0 %    MCV 85 78 - 100 fL    MCH 29.6 26.5 - 33.0 pg    MCHC 34.8 31.5 - 36.5 g/dL    RDW 11.6 10.0 - 15.0 %    Platelet Count 289 150 - 450 10e3/uL    % Neutrophils 60 %    % Lymphocytes 24 %    % Monocytes 6 %    % Eosinophils 6 %    % Basophils 1 %    % Immature Granulocytes 3 %    NRBCs per 100 WBC 0 <1 /100    Absolute Neutrophils 7.8 1.6 - 8.3 10e3/uL    Absolute Lymphocytes 3.1 0.8 - 5.3 10e3/uL    Absolute Monocytes 0.8 0.0 - 1.3 10e3/uL    Absolute Eosinophils 0.7 0.0 - 0.7 10e3/uL    Absolute Basophils 0.1 0.0 - 0.2 10e3/uL    Absolute Immature Granulocytes 0.4 <=0.4 10e3/uL    Absolute NRBCs 0.0 10e3/uL   CBC with platelets differential     Status: Abnormal    Narrative    The following orders were created for panel order CBC with platelets differential.  Procedure                               Abnormality         Status                     ---------                               -----------         ------                     CBC with platelets and d...[[id number]]  Abnormal            Final result                 Please view results for these tests on the individual orders.     Medications   sodium chloride 0.9% BOLUS 1,000 mL (0 mLs Intravenous Stopped 2/23/24 0033)   iopamidol (ISOVUE-370) solution 100 mL (64 mLs Intravenous $Given 2/22/24 7497)   sodium chloride 0.9 % bag 500 mL for CT scan flush use (84 mLs Intravenous $Given 2/22/24 2355)     Labs Ordered and Resulted from Time of ED Arrival to Time of ED Departure   BASIC METABOLIC PANEL - Abnormal       Result Value    Sodium 137      Potassium 4.0      Chloride 102      Carbon Dioxide (CO2) 24      Anion Gap 11      Urea Nitrogen 23.9 (*)     Creatinine 0.87      GFR Estimate >90      Calcium 9.2      Glucose 109 (*)    D DIMER QUANTITATIVE - Abnormal    D-Dimer Quantitative 0.58 (*)    CBC WITH  PLATELETS AND DIFFERENTIAL - Abnormal    WBC Count 12.9 (*)     RBC Count 5.47      Hemoglobin 16.2      Hematocrit 46.6      MCV 85      MCH 29.6      MCHC 34.8      RDW 11.6      Platelet Count 289      % Neutrophils 60      % Lymphocytes 24      % Monocytes 6      % Eosinophils 6      % Basophils 1      % Immature Granulocytes 3      NRBCs per 100 WBC 0      Absolute Neutrophils 7.8      Absolute Lymphocytes 3.1      Absolute Monocytes 0.8      Absolute Eosinophils 0.7      Absolute Basophils 0.1      Absolute Immature Granulocytes 0.4      Absolute NRBCs 0.0     INR - Normal    INR 1.00     INFLUENZA A/B, RSV, & SARS-COV2 PCR - Normal    Influenza A PCR Negative      Influenza B PCR Negative      RSV PCR Negative      SARS CoV2 PCR Negative       CT Chest Pulmonary Embolism w Contrast   Final Result   IMPRESSION:   1.  Negative CT pulmonary angiogram.      XR Chest 2 Views   Final Result   IMPRESSION: No focal airspace consolidation. No pleural effusion or pneumothorax.      Cardiomediastinal silhouette is normal.         Reviewed [facility] emergency department visit on 12/26/2019 for acute bronchitis.  Reviewed previous CBC including 1 previous visit with leukocytosis of 12.7 and no active bacterial infection identified on 7/24/2019.  Reviewed recent comprehensive metabolic panel last year.  Reviewed negative tuberculin skin test 9/3/2021 [facility] [place].  [place]ed [facility] [place] visit on 11/5/2019    Critical care was not performed.     Medical Decision Making  The patient's presentation was of moderate complexity (an undiagnosed new problem with uncertain diagnosis).    The patient's evaluation involved:  review of external note(s) from 2 sources (see separate area of note for details)  review of 3+ test result(s) ordered prior to this encounter (see separate area of note for details)  ordering and/or review of 3+ test(s) in this encounter (see separate area of note for details)  independent  interpretation of testing performed by another health professional (Chest radiograph interpreted by me-no infiltrate-confirmed by radiology)    The patient's management necessitated moderate risk (prescription drug management including medications given in the ED).    Assessment & Plan    23 year old male to the emergency department with productive cough for the past several days now with hemoptysis.  Differential diagnosis includes acute bacterial pneumonia, tuberculosis, pulmonary embolism, COVID-19, influenza, acute bronchitis.  Patient's labs are remarkable for mild leukocytosis but normal hemoglobin level.  No abnormality of INR.  Metabolic panel normal.  Negative influenza and COVID testing.  D-dimer elevated so PE protocol chest CT performed.  CT negative for pulmonary embolism and pneumonia.  Suspect viral URI with acute bronchitis and hemoptysis.  No evidence for bacterial infection.  Will discharged home with Tessalon.  Smoking cessation encouraged.  Primary care follow-up in 1 week if ongoing symptoms.  Return precautions provided.       I have reviewed the nursing notes. I have reviewed the findings, diagnosis, plan and need for follow up with the patient.    New Prescriptions    BENZONATATE (TESSALON) 200 MG CAPSULE    Take 1 capsule (200 mg) by mouth 3 times daily as needed for cough       Final diagnoses:   Hemoptysis   Acute bronchitis, unspecified organism   Viral URI     Chart documentation was completed with Dragon voice-recognition software. Even though reviewed, this chart may still contain some grammatical, spelling, and word errors.     Logan Beard Md    [facility] EMERGENCY DEPARTMENT  2/22/2024     Logan Beard MD  02/23/24 0034

## 2024-02-23 NOTE — DISCHARGE INSTRUCTIONS
Take Tessalon as directed for cough.  Drink plenty of fluids.  Stop smoking.    Follow-up with your primary care clinic in 1 week if ongoing symptoms.    Return if worse, trouble breathing, or other concerns.

## 2024-02-23 NOTE — ED TRIAGE NOTES
reports he has been coughing 4-5 days now and productive with mucous and states onset of coughing up streaks of blood in the last 2 days.  Pt states when he coughs he gets a HA which he has right now, runny nose, and chest pain/dyspnea when he coughs.

## 2024-03-15 ENCOUNTER — HOSPITAL ENCOUNTER (EMERGENCY)
Facility: CLINIC | Age: 24
Discharge: HOME | End: 2024-03-15
Attending: EMERGENCY MEDICINE | Admitting: EMERGENCY MEDICINE
Payer: COMMERCIAL

## 2024-03-15 ENCOUNTER — APPOINTMENT (OUTPATIENT)
Dept: GENERAL RADIOLOGY | Facility: CLINIC | Age: 24
End: 2024-03-15
Attending: EMERGENCY MEDICINE
Payer: COMMERCIAL

## 2024-03-15 VITALS
RESPIRATION RATE: 16 BRPM | SYSTOLIC BLOOD PRESSURE: 117 MMHG | TEMPERATURE: 98.3 F | HEART RATE: 80 BPM | OXYGEN SATURATION: 97 % | DIASTOLIC BLOOD PRESSURE: 68 MMHG

## 2024-03-15 DIAGNOSIS — R05.9 COUGH, UNSPECIFIED TYPE: ICD-10-CM

## 2024-03-15 LAB
FLUAV RNA SPEC QL NAA+PROBE: NEGATIVE
FLUBV RNA RESP QL NAA+PROBE: NEGATIVE
RSV RNA SPEC NAA+PROBE: NEGATIVE
SARS-COV-2 RNA RESP QL NAA+PROBE: NEGATIVE

## 2024-03-15 PROCEDURE — 71046 X-RAY EXAM CHEST 2 VIEWS: CPT

## 2024-03-15 PROCEDURE — 87637 SARSCOV2&INF A&B&RSV AMP PRB: CPT | Performed by: EMERGENCY MEDICINE

## 2024-03-15 PROCEDURE — 99284 EMERGENCY DEPT VISIT MOD MDM: CPT | Performed by: EMERGENCY MEDICINE

## 2024-03-15 PROCEDURE — 99284 EMERGENCY DEPT VISIT MOD MDM: CPT | Mod: 25 | Performed by: EMERGENCY MEDICINE

## 2024-03-15 RX ORDER — ALBUTEROL SULFATE 90 UG/1
2 AEROSOL, METERED RESPIRATORY (INHALATION) EVERY 6 HOURS PRN
Qty: 18 G | Refills: 0 | Status: SHIPPED | OUTPATIENT
Start: 2024-03-15

## 2024-03-15 RX ORDER — GUAIFENESIN/DEXTROMETHORPHAN 100-10MG/5
10 SYRUP ORAL EVERY 4 HOURS PRN
Qty: 420 ML | Refills: 0 | Status: SHIPPED | OUTPATIENT
Start: 2024-03-15 | End: 2024-03-22

## 2024-03-15 ASSESSMENT — ACTIVITIES OF DAILY LIVING (ADL): ADLS_ACUITY_SCORE: 35

## 2024-03-15 NOTE — DISCHARGE INSTRUCTIONS
Thank you for your patience today.  Please follow-up with your regular doctor in the next 2-3 days for further evaluation and follow-up care.  Please call this morning to schedule an appointment.  Please continue your own medications.  Please try to quit or cut down on your tobacco use.  Please use inhaler 1 to 2 puffs every 4-6 hours as needed for cough or shortness of breath.  Please take cough medication as directed.  Please return to the ER if you develop any worsening of your current symptoms.  It was a pleasure taking care of you today.  We hope you feel better soon.

## 2024-03-15 NOTE — ED PROVIDER NOTES
ED Provider Note  [facility]      History     Chief Complaint   Patient presents with    Cough     Pt c/o of frequent productive cough, occasionally coughing up blood for the past 5-6 days     HPI  Madi Zapata is a 23 year old male who presents to the emergency department for evaluation of cough.  Patient reports cough for the past few weeks.  Patient reports productive cough with occasionally coughing up blood which he describes as specks of blood in his sputum over the past few weeks.  Patient reports having coughing attacks where he is coughing so hard that he wakes up his entire family.  Patient denies any fever, chills, denies any nasal congestion, rhinorrhea, no recent illnesses, no sick contacts.  Patient reports occasional chest pain with cough, denies any shortness of breath, abdominal pain, lower extremity edema, calf tenderness.  Patient does smoke tobacco.  No other complaints.    Per chart review patient was seen in the ED on 2/22/2024 for cough and hemoptysis.  At that time patient had comprehensive labs, chest x-ray, and CT which was negative for PE.  Patient reports symptoms have been ongoing since this time despite taking Tessalon Perles.  Patient has yet to follow-up with his primary care provider.    Past Medical History  Past Medical History:   Diagnosis Date    Anxiety      History reviewed. No pertinent surgical history.  albuterol (PROAIR HFA/PROVENTIL HFA/VENTOLIN HFA) 108 (90 Base) MCG/ACT inhaler  guaiFENesin-dextromethorphan (ROBITUSSIN DM) 100-10 MG/5ML syrup  benzonatate (TESSALON) 200 MG capsule  cloNIDine (CATAPRES) 0.1 MG tablet  gabapentin (NEURONTIN) 300 MG capsule  hydrOXYzine (VISTARIL) 25 MG capsule  naloxone (NARCAN) 4 MG/0.1ML nasal spray  ondansetron (ZOFRAN-ODT) 4 MG ODT tab  traZODone (DESYREL) 50 MG tablet      No Known Allergies  Family History  No family history on file.  Social History   Social History     Tobacco Use    Smoking status:  Every Day     Types: Vaping Device, Cigarettes    Smokeless tobacco: Never   Substance Use Topics    Alcohol use: Not Currently    Drug use: Not Currently     Types: Opiates         A medically appropriate review of systems was performed with pertinent positives and negatives noted in the HPI, and all other systems negative.    Physical Exam   BP: 124/64  Pulse: 84  Temp: 98.3  F (36.8  C)  Resp: 16  SpO2: 97 %  Physical Exam  General: Afebrile, no acute distress   HEENT: Normocephalic, atraumatic, conjunctivae normal. MMM  Neck: non-tender, supple  Cardio: regular rate. regular rhythm   Resp: Normal work of breathing, no respiratory distress, lungs clear bilaterally, no wheezing, rhonchi, rales  Chest/Back: no visual signs of trauma, no CVA tenderness   Abdomen: soft, non distension, no tenderness, no peritoneal signs   Neuro: alert and fully oriented. CN II-XII grossly intact. Grossly normal strength and sensation in all extremities.   MSK: no deformities. Normal range of motion  Integumentary/Skin: no rash visualized, normal color  Psych: normal affect, normal behavior          ED Course, Procedures, & Data      Procedures    Results for orders placed or performed during the hospital encounter of 03/15/24   XR Chest 2 Views     Status: None    Narrative    EXAM: CHEST 2 VIEWS  LOCATION: [facility]  DATE: 3/15/2024    INDICATION: Cough.  COMPARISON: 02/22/2024.    FINDINGS: The lungs are clear. Normal size cardiac silhouette.      Impression    IMPRESSION: No evidence of active cardiopulmonary disease.                   Symptomatic Influenza A/B, RSV, & SARS-CoV2 PCR (COVID-19) Nose     Status: Normal    Specimen: Nose; Swab   Result Value Ref Range    Influenza A PCR Negative Negative    Influenza B PCR Negative Negative    RSV PCR Negative Negative    SARS CoV2 PCR Negative Negative    Narrative    Testing was performed using the Xpert Xpress CoV2/Flu/RSV Assay on  the [employer] GeneXpert Instrument. This test should be ordered for the detection of SARS-CoV-2, influenza, and RSV viruses in individuals who meet clinical and/or epidemiological criteria. Test performance is unknown in asymptomatic patients. This test is for in vitro diagnostic use under the FDA EUA for laboratories certified under CLIA to perform high or moderate complexity testing. This test has not been FDA cleared or approved. A negative result does not rule out the presence of PCR inhibitors in the specimen or target RNA in concentration below the limit of detection for the assay. If only one viral target is positive but coinfection with multiple targets is suspected, the sample should be re-tested with another FDA cleared, approved, or authorized test, if coinfection would change clinical management. This test was validated by the [facility]. These laboratories are certified under the Clinical Laboratory Improvement Amendments of 1988 (CLIA-88) as qualified to perform high complexity laboratory testing.     Medications - No data to display  Labs Ordered and Resulted from Time of ED Arrival to Time of ED Departure   INFLUENZA A/B, RSV, & SARS-COV2 PCR - Normal       Result Value    Influenza A PCR Negative      Influenza B PCR Negative      RSV PCR Negative      SARS CoV2 PCR Negative       XR Chest 2 Views   Final Result   IMPRESSION: No evidence of active cardiopulmonary disease.                                   Critical care was not performed.     Medical Decision Making  The patient's presentation was of moderate complexity (an undiagnosed new problem with uncertain diagnosis).    The patient's evaluation involved:  review of external note(s) from 1 sources (most recent ED note)  review of 3+ test result(s) ordered prior to this encounter (chest x-ray, CT PE, comprehensive labs)  strong consideration of a test (CT imaging) that was ultimately deferred  ordering and/or review of 2  test(s) in this encounter (see separate area of note for details)  independent interpretation of testing performed by another health professional (chest x-ray)    The patient's management necessitated moderate risk (prescription drug management including medications given in the ED).    Assessment & Plan    Madi Zapata is a 23 year old male who presents to the emergency department for evaluation of cough.  Upon arrival patient is nontoxic-appearing, afebrile, no distress.  Patient hemodynamically stable and vital signs within normal limits.  Patient with no tachycardia, no hypoxia, no respiratory distress.  Patient with cough for the past few weeks, otherwise nontoxic-appearing, lungs are cleared, patient does use tobacco.  I personally reviewed and interpreted chest x-ray which is unremarkable with no focal infiltrate, pleural effusion, pneumothorax. Viral testing negative for covid/influenza/rsv.  Per chart review patient recently was in the ED for similar symptoms on 2/22/2024 and had unremarkable workup including comprehensive labs, chest x-ray, and CT PE which was negative.  Overall patient is low risk and I have low suspicion for PE as patient with no tachycardia, no hypoxia, no respiratory distress.  Patient describes occasional specks of blood noted in his sputum after prolonged hard coughing episodes.  I discussed with patient and at this time agreed to hold off on repeat CT imaging given risk/benefits.  Suspect likely viral illness vs bronchitis. No evidence of acute bacterial infection.  Recommend continue supportive care with antitussives, smoking sensation, inhaler prn.  Encourage close outpatient follow-up with his primary care provider in the next week if no improvement of his symptoms.  Return precautions discussed.  Patient understands and agrees with the plan.    I have reviewed the nursing notes. I have reviewed the findings, diagnosis, plan and need for follow up with the patient.    New  Prescriptions    ALBUTEROL (PROAIR HFA/PROVENTIL HFA/VENTOLIN HFA) 108 (90 BASE) MCG/ACT INHALER    Inhale 2 puffs into the lungs every 6 hours as needed for shortness of breath, wheezing or cough    GUAIFENESIN-DEXTROMETHORPHAN (ROBITUSSIN DM) 100-10 MG/5ML SYRUP    Take 10 mLs by mouth every 4 hours as needed for cough       Final diagnoses:   Cough, unspecified type       Rajwinder Templeton MD  [facility] EMERGENCY DEPARTMENT  3/15/2024     Rajwinder Templeton MD  03/15/24 0618

## 2024-03-15 NOTE — ED NOTES
Pt given discharge paperwork and instructions. No questions or concerns at this time. VSS. A&O x4. Ambulatory with steady gait

## 2024-05-07 ENCOUNTER — OFFICE VISIT (OUTPATIENT)
Dept: BEHAVIORAL HEALTH | Facility: CLINIC | Age: 24
End: 2024-05-07
Payer: COMMERCIAL

## 2024-05-07 VITALS — HEART RATE: 114 BPM | SYSTOLIC BLOOD PRESSURE: 150 MMHG | DIASTOLIC BLOOD PRESSURE: 81 MMHG | TEMPERATURE: 98.1 F

## 2024-05-07 DIAGNOSIS — F11.93 OPIOID WITHDRAWAL (H): ICD-10-CM

## 2024-05-07 DIAGNOSIS — F11.20 OPIOID USE DISORDER, SEVERE, DEPENDENCE (H): Primary | ICD-10-CM

## 2024-05-07 LAB
AMPHETAMINE QUAL URINE POCT: ABNORMAL
BARBITURATE QUAL URINE POCT: NEGATIVE
BENZODIAZEPINE QUAL URINE POCT: NEGATIVE
BUPRENORPHINE QUAL URINE POCT: NEGATIVE
COCAINE QUAL URINE POCT: NEGATIVE
CREATININE QUAL URINE POCT: ABNORMAL
FENTANYL UR QL: ABNORMAL
INTERNAL QC QUAL URINE POCT: ABNORMAL
MDMA QUAL URINE POCT: NEGATIVE
METHADONE QUAL URINE POCT: NEGATIVE
METHAMPHETAMINE QUAL URINE POCT: ABNORMAL
OPIATE QUAL URINE POCT: NEGATIVE
OXYCODONE QUAL URINE POCT: NEGATIVE
PH QUAL URINE POCT: ABNORMAL
PHENCYCLIDINE QUAL URINE POCT: NEGATIVE
POCT KIT EXPIRATION DATE: ABNORMAL
POCT KIT LOT NUMBER: ABNORMAL
SPECIFIC GRAVITY POCT: 1
TEMPERATURE URINE POCT: ABNORMAL
THC QUAL URINE POCT: NEGATIVE

## 2024-05-07 PROCEDURE — 99214 OFFICE O/P EST MOD 30 MIN: CPT

## 2024-05-07 PROCEDURE — G0480 DRUG TEST DEF 1-7 CLASSES: HCPCS | Mod: 90

## 2024-05-07 PROCEDURE — 80307 DRUG TEST PRSMV CHEM ANLYZR: CPT

## 2024-05-07 PROCEDURE — 99000 SPECIMEN HANDLING OFFICE-LAB: CPT

## 2024-05-07 RX ORDER — CLONIDINE HYDROCHLORIDE 0.1 MG/1
.1-.2 TABLET ORAL 3 TIMES DAILY PRN
Qty: 15 TABLET | Refills: 0 | Status: CANCELLED | OUTPATIENT
Start: 2024-05-07

## 2024-05-07 RX ORDER — TRAZODONE HYDROCHLORIDE 50 MG/1
50-100 TABLET, FILM COATED ORAL AT BEDTIME
Qty: 60 TABLET | Refills: 0 | Status: CANCELLED | OUTPATIENT
Start: 2024-05-07 | End: 2024-06-06

## 2024-05-07 RX ORDER — BUPRENORPHINE AND NALOXONE 8; 2 MG/1; MG/1
FILM, SOLUBLE BUCCAL; SUBLINGUAL
Qty: 30 FILM | Refills: 0 | Status: CANCELLED | OUTPATIENT
Start: 2024-05-07

## 2024-05-07 RX ORDER — GABAPENTIN 300 MG/1
300 CAPSULE ORAL 3 TIMES DAILY PRN
Qty: 21 CAPSULE | Refills: 0 | Status: CANCELLED | OUTPATIENT
Start: 2024-05-07

## 2024-05-07 ASSESSMENT — PATIENT HEALTH QUESTIONNAIRE - PHQ9: SUM OF ALL RESPONSES TO PHQ QUESTIONS 1-9: 13

## 2024-05-07 NOTE — NURSING NOTE
[facility]    Discuss getting Naltrexone     Rooming information:    Starting withdrawal; hot and cold sweats, muscle pain, sedation    Approximate last use of full opioid agonist: yesterday Fentanyl; over 24 hours ago   Taking buprenorphine? No   Narcan currently available: Yes  Other recent substance use:    Methamphetamine   NICOTINE-Yes: vape   If using nicotine, ready to quit? No     Point of care urine drug screen positive for:  Lab Results   Component Value Date    BUP Negative 05/07/2024    BZO Negative 05/07/2024    BAR Negative 05/07/2024    BARBARA Negative 05/07/2024    MAMP Screen Positive (A) 05/07/2024    AMP Screen Positive (A) 05/07/2024    MDMA Negative 05/07/2024    MTD Negative 05/07/2024    RAW114 Negative 05/07/2024    OXY Negative 05/07/2024    PCP Negative 05/07/2024    THC Negative 05/07/2024    TEMP 92 F 05/07/2024    SGPOCT 1.005 05/07/2024       *POC urine drug screen does not screen for Fentanyl          5/7/2024    10:00 AM   PHQ Assesment Total Score(s)   PHQ-9 Score 13       If PHQ-9 score of 15 or higher, has [facility] therapist or provider been notified? N/A    Any current suicidal ideation? No  If yes, has [facility] therapist or provider been notified? N/A    [facility] care provider: [facility]     Mental health provider: no (follow up on MH referral if needed)    Housing needs: stable     Insurance: active     Current legal issues: probation     Contact information up to date? Yes     3rd Party Involvement  (please obtain BENITO if pt would like to include)    Farhat Carolina MA  May 7, 2024  10:43 AM

## 2024-05-07 NOTE — PROGRESS NOTES
[facility] Recovery [facility] Follow Up    ASSESSMENT/PLAN                                                      1. Opioid use disorder, severe, dependence (H)  -needs improvement  -discussion regarding risks of returning to use and overdose with oral naltrexone.  Oral naltrexone is contraindicated in the treatment of opioid use disorder due to risk of overdose and would not be prescribed as  long term treatment.  Offered Oral naltrexone as a short term plan  to continue with vivitrol for long term maintenance.  With reported inconsistent use of naltrexone in the past, not currently in a supervised setting for observed dosing risk for overdose. Opioid overdose that is related to oral naltrexone use may result in a fatal overdose.     -Discussion regarding suboxone benefits, and options for sublocade as an option for a buprenorphine taper.  Informed that he could return to [facility] if this is an option that is offered.  Madi is not willing to restart suboxone, and is ONLY requesting oral naltrexone. He is not willing to receive vivitrol due to fear of needles.      -Madi expressed frustration with evidence based treatment recommendation and left clinic abruptly.     - Drugs of Abuse Screen Urine (POC CUPS) POCT; Standing  - Fentanyl Qualitative with Reflex to Quant Urine; Future  - Drugs of Abuse Screen Urine (POC CUPS) POCT  - naloxone (NARCAN) 4 MG/0.1ML nasal spray; Spray 1 spray (4 mg) into one nostril alternating nostrils as needed for opioid reversal every 2-3 minutes until assistance arrives Harm reduction  Dispense: 0.2 mL; Refill: 11  til assistance arrives Harm reduction  Dispense: 0.2 mL; Refill: 11  -continue with treatment through [facility] and follow recommendations    -return to recovery clinic if would like to initiate evidence based treatment options such as suboxone, sublocade or vivitrol injections.        2. Opioid withdrawal (H)  -needs improvement   -pt left  "clinic prior to withdrawal treatment options      No follow-ups on file.      Patient counseling completed today:  Discussed mechanism of action, potential risks/benefits/side effects of medications and other recommendations above.     Discussed risk of precipitated withdrawal with initiation of buprenorphine in the presence of full opioid agonists.    Reviewed directions for initiation of buprenorphine to reduce risk of precipitated withdrawal and maximize efficacy.    Harm reduction counseling including never use alone, availability of naloxone, risks associated with concurrent use of opioids and benzodiazepines, alcohol, or other sedatives, safer administration as applicable.  Discussed importance of avoiding isolation, building a network of supportive relationships, avoiding people/places/things associated with past use to reduce risk of relapse; including motivational interviewing regarding psychosocial interventions.   SUBJECTIVE                                                          CC/HPI:  Madi Zapata is a 23 year old male with PMH Opioid use who presents to the Recovery Clinic for return visit. Initial visit 4/27/22, then lost to follow up.  Returned to recovery clinic 5/7/24     First Recovery Clinic visit 4/27/22    Most recent Recovery Clinic visit 4/27/22.      CC/HPI:  Madi Zapata is a 21 year old male with PMH of opioid use disorder who presents to the Recovery Clinic for initial visit.  Pt was referred by PRS at [facility].  Pt decided to seek treatment in Recovery Clinic to establish with suboxone provider.      Reports he was in treatment at [facility] For 3 and 1/2 weeks. He was kicked out for substance use and \"not reporting a friend who was using at the facility\" He is now in treatment at [facility]. He was brought here by a PRS there. He was taking Suboxone 8 mg BID. He has an active prescription but reports he is not sure where the rest of his medications are. He has been using fentanyl " pills for the past 6 days. No history of over dose. Typically uses 10 -15 pills per day. However with recent relapse he has been using about 5 pills. He also used meth on Saturday. He does not typically use this, maybe once in the past 6 month. Used it to combat adverse side effects of opioids with withdrawal.  Reports his last use of fentanyl was over 24 hours ago. This is his third time in treatment. Longest sober period was 3 months. He is on probation and court ordered to be in treatment, he is not sure if being at [facility] now will fulfill that obligation since he was discharged from [facility]. Would like STD screening today. Sexually active. Not symptomatic. Only slept for 2 hours last night, very drowsy today. When asked directly pt denies any substance use in the past 24 hours. Denies benzodiazepine or alcohol use.     A/P last visit:  1. Opioid use disorder, severe, dependence (H)  - Discussed mechanism of action, potential risks/benefits/side effects of Suboxone. Discussed risk of precipitated withdrawal with initiation of buprenorphine in the presence of full opioid agonists. Has been greater than 24 hours since last fentanyl use, recommended starting with 1/2 film and titration of dose up to 16 mg per day.   - Prescription sent for narcan with refills for harm reduction   - Encouraged treatment at [facility] and ongoing recovery supports.   - SUBOXONE 8-2 MG per film; Place 1 Film under the tongue 2 times daily for 7 days  Dispense: 14 Film; Refill: 0  - Hepatic panel (Albumin, ALT, AST, Bili, Alk Phos, TP); Future     2. Opioid dependence with withdrawal (H)  - gabapentin (NEURONTIN) 300 MG capsule; Take 1 capsule (300 mg) by mouth 3 times daily as needed for other (withdrawal symptoms including anxiety, restlessness, sleeplessness)  Dispense: 21 capsule; Refill: 0  - cloNIDine (CATAPRES) 0.1 MG tablet; Take 1 tablet (0.1 mg) by mouth 3 times daily as needed (opioid withdrawal, anxiety, restlessness,  "irritability, hot/cold flashes)  Dispense: 21 tablet; Refill: 0  - traZODone (DESYREL) 50 MG tablet; Take 1 tablet (50 mg) by mouth At Bedtime  Dispense: 30 tablet; Refill: 0  - ondansetron (ZOFRAN-ODT) 4 MG ODT tab; Take 1 tablet (4 mg) by mouth every 8 hours as needed for nausea  Dispense: 12 tablet; Refill: 0     3. Screen for STD (sexually transmitted disease)  - Pt requesting STD screening today. Not symptomatic. HSV 1 and/or 2 IgG 22.3 on 11/19/21  - HIV Antigen Antibody Combo; Future  - Hepatitis C Screen Reflex to HCV RNA Quant and Genotype; Future  - Treponema Abs w Reflex to RPR and Titer; Future  - NEISSERIA GONORRHOEA PCR  - CHLAMYDIA TRACHOMATIS PCR       05/07/24 visit:  Was on suboxone after last visit, started on naltrexone for 3 months, then discontinued.   Currently in treatment at [facility].  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  During the taper he experienced withdrawal symptoms.  After discontinuing suboxone he returned to use in about a week.      Madi had received oral naltrexone through [facility] while in treatment last year and found oral naltrexone effective.  Madi would like to use oral naltrexone to treat his opioid use disorder. Reported use to be inconsistent and found it effective for managing cravings.     Last date of full opioid agonist use: 5/6/24 11:00     Substance Use History :  Opioids:   Age at first use: 21 yo, tried from a friend. \"only escalated from there\"   Current use: timing of last use: 4/26/22 1400; substance: \"perc 30\"; quantity 10-15 pills daily; route: oral                 IV drug use: No   History of overdose: No  Previous treatments : Yes  Longest period of sobriety: 3 months  Medical complications related to substance use: denies  Hepatitis C: nonreactive 11/19/21  HIV:nonreactive 11/19/21     Taking buprenorphine? No, ran " out last week  Narcan currently available: No     DSM-5 OUD criteria met:  Taken in larger amounts/greater time spent in behavior over longer period of time than intended,Yes   Persistent desire or unsuccessful efforts to cut down or control use/behavior, Yes   A great deal of time is spent in activities necessary to obtain the substance/participate in the behavior or recover from its effects, Yes   Cravings, Yes   Recurrent use/behavior resulting in failure to fulfill major role obligations at work, school, or home, Yes   Continued use/behavior despite having persistent or recurrent social or interpersonal problems caused or exacerbated by effects of use/behavior, Yes   Important social, occupational, or recreational activities are given up or reduced because of use/behavior, Yes   Recurrent use/behavior in situations in which it is physically hazardous, No   Continued use/behavior despite knowledge of having a persistent or recurrent physical or psychological problem that is likely to have been caused or exacerbated by use/behavior, Yes   Tolerance, Yes    Withdrawal, Yes      Other Addiction History:  Stimulants:   Past use: denies  Use in last 6 months: Meth one time  Sedatives/hypnotics/anxiolytics:   Past use: denies  Use in last 6 months: denies  Alcohol:   Past use: denies  Use in last 6 months: denies  Nicotine:   Cigarettes: 2-3 daily  Vaping: denies  Chewing tobacco: denies  Cannabis:   Past use: started at age 15  Use in last 6 months: daily use  Hallucinogens:   Past use: denies  Use in last 6 months: denies  Behavioral addictions:   denies     PAST PSYCHIATRIC HISTORY:  Diagnoses- PTSD, depression   Suicide Attempts: No   Hospitalizations: No          5/7/2024    10:00 AM   PHQ   PHQ-9 Total Score 13   Q9: Thoughts of better off dead/self-harm past 2 weeks Not at all       Social History  Housing status: with partner  Education:   Employment status: starting work next week   Relationship status:  Partnered  Children: 2 children    Currently on probation     Labs discussed with patient?  Yes      [place] Prescription Drug Monitoring Program Reviewed:  Yes;     Medications:  Current Outpatient Medications   Medication Sig Dispense Refill    albuterol (PROAIR HFA/PROVENTIL HFA/VENTOLIN HFA) 108 (90 Base) MCG/ACT inhaler Inhale 2 puffs into the lungs every 6 hours as needed for shortness of breath, wheezing or cough 18 g 0    benzonatate (TESSALON) 200 MG capsule Take 1 capsule (200 mg) by mouth 3 times daily as needed for cough 10 capsule 0    cloNIDine (CATAPRES) 0.1 MG tablet Take 1 tablet (0.1 mg) by mouth 3 times daily as needed (opioid withdrawal, anxiety, restlessness, irritability, hot/cold flashes) 21 tablet 0    gabapentin (NEURONTIN) 300 MG capsule Take 1 capsule (300 mg) by mouth 3 times daily as needed for other (withdrawal symptoms including anxiety, restlessness, sleeplessness) 21 capsule 0    hydrOXYzine (VISTARIL) 25 MG capsule       naloxone (NARCAN) 4 MG/0.1ML nasal spray Spray 1 spray (4 mg) into one nostril alternating nostrils as needed for opioid reversal every 2-3 minutes until assistance arrives 0.2 mL 3    ondansetron (ZOFRAN-ODT) 4 MG ODT tab Take 1 tablet (4 mg) by mouth every 8 hours as needed for nausea 12 tablet 0    traZODone (DESYREL) 50 MG tablet Take 1 tablet (50 mg) by mouth At Bedtime 30 tablet 0     No current facility-administered medications for this visit.       No Known Allergies    PMH, PSH, FamHx reviewed      OBJECTIVE                                                      There were no vitals taken for this visit.    Physical Exam  Constitutional:       Appearance: Normal appearance.   HENT:      Right Ear: External ear normal.      Left Ear: External ear normal.      Nose: Nose normal.   Eyes:      Extraocular Movements: Extraocular movements intact.      Conjunctiva/sclera: Conjunctivae normal.      Pupils: Pupils are equal, round, and reactive to light.    Pulmonary:      Effort: Pulmonary effort is normal.   Musculoskeletal:         General: Normal range of motion.      Cervical back: Normal range of motion.   Neurological:      General: No focal deficit present.      Mental Status: He is alert and oriented to person, place, and time.   Psychiatric:      Comments: Irritable.  Frustrated with evidence based recommendations         Labs:    UDS:    Lab Results   Component Value Date    BUP Negative 05/07/2024    BZO Negative 05/07/2024    BAR Negative 05/07/2024    BARBARA Negative 05/07/2024    MAMP Screen Positive (A) 05/07/2024    AMP Screen Positive (A) 05/07/2024    MDMA Negative 05/07/2024    MTD Negative 05/07/2024    VYO033 Negative 05/07/2024    OXY Negative 05/07/2024    PCP Negative 05/07/2024    THC Negative 05/07/2024    TEMP 92 F 05/07/2024    SGPOCT 1.005 05/07/2024     *POC urine drug screen does not screen for Fentanyl      Recent Results (from the past 240 hour(s))   Drugs of Abuse Screen Urine (POC CUPS) POCT    Collection Time: 05/07/24 10:51 AM   Result Value Ref Range    POCT Kit Lot Number [id number]     POCT Kit Expiration Date 1/18/26     Temperature Urine POCT 92 F 90 F, 92 F, 94 F, 96 F, 98 F, 100 F    Specific [place] POCT 1.005 1.005, 1.015, 1.025    pH Qual Urine POCT 9 pH 4 pH, 5 pH, 7 pH, 9 pH    Creatinine Qual Urine POCT 100 mg/dL 20 mg/dL, 50 mg/dL, 100 mg/dL, 200 mg/dL    Internal QC Qual Urine POCT Valid Valid    Amphetamine Qual Urine POCT Screen Positive (A) Negative    Barbiturate Qual Urine POCT Negative Negative    Buprenorphine Qual Urine POCT Negative Negative    Benzodiazepine Qual Urine POCT Negative Negative    Cocaine Qual Urine POCT Negative Negative    Methamphetamine Qual Urine POCT Screen Positive (A) Negative    MDMA Qual Urine POCT Negative Negative    Methadone Qual Urine POCT Negative Negative    Opiate Qual Urine POCT Negative Negative    Oxycodone Qual Urine POCT Negative Negative    Phencyclidine Qual Urine POCT  Negative Negative    THC Qual Urine POCT Negative Negative       Magnolia Pretty NP    [facility]  [address] [place] [id number] [phone]

## 2024-05-11 LAB
FENTANYL UR-MCNC: 42.9 NG/ML
NORFENTANYL UR-MCNC: >1000 NG/ML

## 2024-05-15 ENCOUNTER — HOSPITAL ENCOUNTER (EMERGENCY)
Facility: CLINIC | Age: 24
Discharge: HOME | End: 2024-05-15
Admitting: PHYSICIAN ASSISTANT
Payer: COMMERCIAL

## 2024-05-15 VITALS
DIASTOLIC BLOOD PRESSURE: 74 MMHG | BODY MASS INDEX: 27.6 KG/M2 | SYSTOLIC BLOOD PRESSURE: 113 MMHG | TEMPERATURE: 99.5 F | WEIGHT: 181.5 LBS | OXYGEN SATURATION: 97 % | RESPIRATION RATE: 16 BRPM | HEART RATE: 92 BPM

## 2024-05-15 DIAGNOSIS — B34.9 VIRAL SYNDROME: ICD-10-CM

## 2024-05-15 PROCEDURE — 99283 EMERGENCY DEPT VISIT LOW MDM: CPT | Performed by: PHYSICIAN ASSISTANT

## 2024-05-15 PROCEDURE — 87637 SARSCOV2&INF A&B&RSV AMP PRB: CPT | Performed by: EMERGENCY MEDICINE

## 2024-05-15 PROCEDURE — 250N000011 HC RX IP 250 OP 636: Performed by: PHYSICIAN ASSISTANT

## 2024-05-15 PROCEDURE — 250N000013 HC RX MED GY IP 250 OP 250 PS 637: Performed by: PHYSICIAN ASSISTANT

## 2024-05-15 RX ORDER — ONDANSETRON 4 MG/1
4 TABLET, ORALLY DISINTEGRATING ORAL ONCE
Status: COMPLETED | OUTPATIENT
Start: 2024-05-15 | End: 2024-05-15

## 2024-05-15 RX ORDER — IBUPROFEN 600 MG/1
600 TABLET, FILM COATED ORAL EVERY 6 HOURS PRN
Qty: 20 TABLET | Refills: 0 | Status: SHIPPED | OUTPATIENT
Start: 2024-05-15

## 2024-05-15 RX ORDER — ACETAMINOPHEN 500 MG
1000 TABLET ORAL ONCE
Status: COMPLETED | OUTPATIENT
Start: 2024-05-15 | End: 2024-05-15

## 2024-05-15 RX ADMIN — ACETAMINOPHEN 1000 MG: 500 TABLET ORAL at 15:53

## 2024-05-15 RX ADMIN — ONDANSETRON 4 MG: 4 TABLET, ORALLY DISINTEGRATING ORAL at 15:53

## 2024-05-15 ASSESSMENT — ENCOUNTER SYMPTOMS: FLU SYMPTOMS: 1

## 2024-05-15 ASSESSMENT — COLUMBIA-SUICIDE SEVERITY RATING SCALE - C-SSRS
1. IN THE PAST MONTH, HAVE YOU WISHED YOU WERE DEAD OR WISHED YOU COULD GO TO SLEEP AND NOT WAKE UP?: NO
2. HAVE YOU ACTUALLY HAD ANY THOUGHTS OF KILLING YOURSELF IN THE PAST MONTH?: NO
6. HAVE YOU EVER DONE ANYTHING, STARTED TO DO ANYTHING, OR PREPARED TO DO ANYTHING TO END YOUR LIFE?: NO

## 2024-05-15 ASSESSMENT — ACTIVITIES OF DAILY LIVING (ADL): ADLS_ACUITY_SCORE: 35

## 2024-05-15 NOTE — ED PROVIDER NOTES
ED Provider Note  [facility]      History     Chief Complaint   Patient presents with    Flu Symptoms       Flu Symptoms    22yo F no pmhx p/w viral illness symptoms x 2-3 days.  Patient reports headaches, myalgias, congestion, rhinorrhea, subjective fever and chills, nausea and a few episodes of vomiting.  No CP, SOB, abdominal pain, urinary symptoms, sore throat.  Stooling normally.  No known ill contacts.  Has not attempted anything for symptoms.    Past Medical History  Past Medical History:   Diagnosis Date    Anxiety      History reviewed. No pertinent surgical history.  ibuprofen (ADVIL/MOTRIN) 600 MG tablet  albuterol (PROAIR HFA/PROVENTIL HFA/VENTOLIN HFA) 108 (90 Base) MCG/ACT inhaler  benzonatate (TESSALON) 200 MG capsule  cloNIDine (CATAPRES) 0.1 MG tablet  gabapentin (NEURONTIN) 300 MG capsule  hydrOXYzine (VISTARIL) 25 MG capsule  naloxone (NARCAN) 4 MG/0.1ML nasal spray  naloxone (NARCAN) 4 MG/0.1ML nasal spray  ondansetron (ZOFRAN-ODT) 4 MG ODT tab  traZODone (DESYREL) 50 MG tablet      No Known Allergies  Family History  History reviewed. No pertinent family history.  Social History   Social History     Tobacco Use    Smoking status: Every Day     Types: Vaping Device, Cigarettes    Smokeless tobacco: Never   Substance Use Topics    Alcohol use: Not Currently    Drug use: Not Currently     Types: Opiates         A medically appropriate review of systems was performed with pertinent positives and negatives noted in the HPI, and all other systems negative.    Physical Exam   BP: 113/74  Pulse: 92  Temp: 99.5  F (37.5  C)  Resp: 16  Weight: 82.3 kg (181 lb 8 oz)  SpO2: 97 %  Physical Exam  Constitutional:       General: He is not in acute distress.     Appearance: Normal appearance. He is not toxic-appearing.   HENT:      Head: Atraumatic.   Eyes:      Conjunctiva/sclera: Conjunctivae normal.   Cardiovascular:      Rate and Rhythm: Normal rate and regular rhythm.      Heart  sounds: Normal heart sounds.   Pulmonary:      Effort: Pulmonary effort is normal. No respiratory distress.      Breath sounds: Normal breath sounds.   Abdominal:      General: Abdomen is flat.      Palpations: Abdomen is soft.      Tenderness: There is no abdominal tenderness.   Musculoskeletal:      Cervical back: Neck supple.   Skin:     General: Skin is warm.   Neurological:      Mental Status: He is alert.           ED Course, Procedures, & Data      Procedures               Results for orders placed or performed during the hospital encounter of 05/15/24   Asymptomatic Influenza A/B, RSV, & SARS-CoV2 PCR (COVID-19) Nasopharyngeal     Status: Normal    Specimen: Nasopharyngeal; Swab   Result Value Ref Range    Influenza A PCR Negative Negative    Influenza B PCR Negative Negative    RSV PCR Negative Negative    SARS CoV2 PCR Negative Negative    Narrative    Testing was performed using the Xpert Xpress CoV2/Flu/RSV Assay on the [employer] Instrument. This test should be ordered for the detection of SARS-CoV-2, influenza, and RSV viruses in individuals who meet clinical and/or epidemiological criteria. Test performance is unknown in asymptomatic patients. This test is for in vitro diagnostic use under the FDA EUA for laboratories certified under CLIA to perform high or moderate complexity testing. This test has not been FDA cleared or approved. A negative result does not rule out the presence of PCR inhibitors in the specimen or target RNA in concentration below the limit of detection for the assay. If only one viral target is positive but coinfection with multiple targets is suspected, the sample should be re-tested with another FDA cleared, approved, or authorized test, if coinfection would change clinical management. This test was validated by the [facility]. These laboratories are certified under the Clinical Laboratory Improvement Amendments of 1988 (CLIA-88) as qualified to  perform high complexity laboratory testing.     Medications   ondansetron (ZOFRAN ODT) ODT tab 4 mg (4 mg Oral $Given 5/15/24 1323)   acetaminophen (TYLENOL) tablet 1,000 mg (1,000 mg Oral $Given 5/15/24 6229)     Labs Ordered and Resulted from Time of ED Arrival to Time of ED Departure   INFLUENZA A/B, RSV, & SARS-COV2 PCR - Normal       Result Value    Influenza A PCR Negative      Influenza B PCR Negative      RSV PCR Negative      SARS CoV2 PCR Negative       No orders to display          Critical care was not performed.     Medical Decision Making  The patient's presentation was of moderate complexity (an undiagnosed new problem with uncertain diagnosis).    The patient's evaluation involved:  review of external note(s) from 3+ sources (clinical)  ordering and/or review of 3+ test(s) in this encounter (see separate area of note for details)    The patient's management necessitated moderate risk (prescription drug management including medications given in the ED).    Assessment & Plan    22yo F no pmhx p/w viral illness symptoms x 2-3 days. Headaches, myalgias, congestion, rhinorrhea, subjective fever and chills, nausea and a few episodes of vomiting.  No CP, SOB, abdominal pain, urinary symptoms, sore throat.  Stooling normally.  Although patient has a history of opioid abuse, he states he is not using quite some time, and is currently in treatment.    In ED patient is HDS/AF, NAD, well-appearing.  He has a unremarkable physical exam with benign abdomen and clear chest.    DDx viral syndrome versus COVID versus flu versus influenza versus unlikely opioid withdrawal (patient denies recent use, denies withdrawal), bacteremia.  No concern for acute surgical abdomen.    COVID, flu, RSV negative.  However, given patient's normal vital signs, well-appearing, tolerating p.o. feel he is safe for discharge at this time without further workup.  Discharged with strict return precautions for worsening symptoms.  Advised  PCP follow-up as needed.      I have reviewed the nursing notes. I have reviewed the findings, diagnosis, plan and need for follow up with the patient.    New Prescriptions    IBUPROFEN (ADVIL/MOTRIN) 600 MG TABLET    Take 1 tablet (600 mg) by mouth every 6 hours as needed for moderate pain       Final diagnoses:   Viral syndrome         Dane Khanna PA-C  [facility] EMERGENCY DEPARTMENT  5/15/2024     Dane Khanna PA-C  05/15/24 1134